# Patient Record
Sex: FEMALE | Race: WHITE | NOT HISPANIC OR LATINO | URBAN - METROPOLITAN AREA
[De-identification: names, ages, dates, MRNs, and addresses within clinical notes are randomized per-mention and may not be internally consistent; named-entity substitution may affect disease eponyms.]

---

## 2024-09-03 ENCOUNTER — OFFICE VISIT (OUTPATIENT)
Dept: ORTHOPEDIC SURGERY | Facility: CLINIC | Age: 69
End: 2024-09-03
Payer: MEDICARE

## 2024-09-03 VITALS — HEART RATE: 66 BPM | HEIGHT: 63 IN | BODY MASS INDEX: 39.87 KG/M2 | OXYGEN SATURATION: 99 % | WEIGHT: 225 LBS

## 2024-09-03 DIAGNOSIS — S52.562A CLOSED BARTON'S FRACTURE OF LEFT RADIUS, INITIAL ENCOUNTER: Primary | ICD-10-CM

## 2024-09-03 PROCEDURE — 1160F RVW MEDS BY RX/DR IN RCRD: CPT | Performed by: FAMILY MEDICINE

## 2024-09-03 PROCEDURE — 1159F MED LIST DOCD IN RCRD: CPT | Performed by: FAMILY MEDICINE

## 2024-09-03 PROCEDURE — 99203 OFFICE O/P NEW LOW 30 MIN: CPT | Performed by: FAMILY MEDICINE

## 2024-09-03 RX ORDER — LACTULOSE 10 G/15ML
SOLUTION ORAL
COMMUNITY
Start: 2024-06-16

## 2024-09-03 RX ORDER — FUROSEMIDE 40 MG
1 TABLET ORAL DAILY
COMMUNITY
Start: 2024-08-30

## 2024-09-03 RX ORDER — BACLOFEN 20 MG
1 TABLET ORAL EVERY 12 HOURS SCHEDULED
COMMUNITY
Start: 2024-07-29

## 2024-09-03 RX ORDER — OMEPRAZOLE 40 MG/1
1 CAPSULE, DELAYED RELEASE ORAL DAILY
COMMUNITY

## 2024-09-03 RX ORDER — MECLIZINE HYDROCHLORIDE 25 MG/1
1 TABLET ORAL 3 TIMES DAILY PRN
COMMUNITY

## 2024-09-03 RX ORDER — ATORVASTATIN CALCIUM 10 MG/1
1 TABLET, FILM COATED ORAL DAILY
COMMUNITY

## 2024-09-03 RX ORDER — HYDROCODONE BITARTRATE AND ACETAMINOPHEN 5; 325 MG/1; MG/1
1 TABLET ORAL EVERY 6 HOURS PRN
COMMUNITY
Start: 2024-08-30

## 2024-09-03 RX ORDER — NORTRIPTYLINE HCL 10 MG
CAPSULE ORAL
COMMUNITY

## 2024-09-03 RX ORDER — DICYCLOMINE HCL 20 MG
1 TABLET ORAL 2 TIMES DAILY
COMMUNITY

## 2024-09-03 RX ORDER — SEMAGLUTIDE 1.34 MG/ML
INJECTION, SOLUTION SUBCUTANEOUS
COMMUNITY

## 2024-09-03 RX ORDER — SPIRONOLACTONE 100 MG/1
1 TABLET, FILM COATED ORAL DAILY
COMMUNITY

## 2024-09-03 RX ORDER — RIZATRIPTAN BENZOATE 10 MG/1
TABLET ORAL
COMMUNITY

## 2024-09-03 NOTE — PROGRESS NOTES
"Primary Care Sports Medicine Office Visit Note    Patient ID: Katrin Cornejo is a 69 y.o. female.    Chief Complaint:  Chief Complaint   Patient presents with    Left Wrist - Pain, Initial Evaluation     DOI: 8/29/24 fall   7/10 pain        History of Present Illness  The patient presents for evaluation of left wrist pain. She is accompanied by her sister.    She experienced a fall at approximately 4:15 in the morning while attempting to use the restroom, during which she extended her hand to break the fall. She also reports tenderness in her elbow.    Supplemental Information  She has diabetes, vertigo, nonalcoholic cirrhosis of the liver. She is on Lasix for lower leg swelling. Her kidneys are healthy. She has no heart problems.       Past Medical History:   Diagnosis Date    Diabetes     GERD (gastroesophageal reflux disease)     High cholesterol     Hypertension     Liver cirrhosis secondary to PATE (nonalcoholic steatohepatitis)     Migraine     Vertigo        Past Surgical History:   Procedure Laterality Date    GALLBLADDER SURGERY      HYSTERECTOMY      SHOULDER ARTHROSCOPY Right        History reviewed. No pertinent family history.  Social History     Occupational History    Not on file   Tobacco Use    Smoking status: Never     Passive exposure: Never    Smokeless tobacco: Never   Vaping Use    Vaping status: Never Used   Substance and Sexual Activity    Alcohol use: Never    Drug use: Never    Sexual activity: Never        Review of Systems:  Review of Systems   Constitutional:  Negative for activity change, fatigue and fever.   Musculoskeletal:  Positive for arthralgias.   Skin:  Negative for color change and rash.   Neurological:  Negative for numbness.     Objective:  Physical Exam  Pulse 66   Ht 160 cm (63\")   Wt 102 kg (225 lb)   SpO2 99%   BMI 39.86 kg/m²   Vitals and nursing note reviewed.   Constitutional:       General: she  is not in acute distress.     Appearance: she is well-developed. He is " "not diaphoretic.   HENT:      Head: Normocephalic and atraumatic.   Eyes:      Conjunctiva/sclera: Conjunctivae normal.   Pulmonary:      Effort: Pulmonary effort is normal. No respiratory distress.   Skin:     General: Skin is warm.      Capillary Refill: Capillary refill takes less than 2 seconds.   Neurological:      Mental Status: she is alert.     Ortho Exam:  Physical Exam  Evaluation of the left wrist reveals significant bruising and ecchymosis encompassing the entire wrist and back of the hand. Wrist movements including flexion, extension, ulnar deviation, radial deviation, pronation, and supination are severely restricted, with only about 5 or 10 degrees of each motion possible. The distal radius is notably tender to touch. The distal radial ulnar joint exhibits clear laxity.    Results  Imaging  X-ray of the left wrist reveals a three-part Ghotra's fracture of the distal radius and an ulnar styloid fracture of the distal ulna.    Diagnoses and all orders for this visit:          Assessment & Plan  1. Closed Ghotra's fracture of left radius, initial encounter (Primary)  -     Ambulatory Referral to Hand Surgery    The left wrist evaluation shows significant bruising, ecchymosis, and limited motion. Imaging reveals an intra-articular 3-part Ghotra's fracture. Surgical intervention is recommended to realign the bone and prevent long-term arthritis and pain. A referral to a hand surgeon has been made for further evaluation and treatment planning. If surgery is not pursued, a cast may be considered, but it may result in poor healing and chronic pain.    2. Left Ulnar styloid fracture    Imaging also shows a fracture of the ulnar styloid. This fracture is less critical as it does not significantly impact wrist function. It may heal on its own or form scar tissue without causing issues. No specific intervention is required for this fracture unless it becomes symptomatic.    Aryan LEE \"Chance\" Yousif II, DO, " [facility]  09/03/24  10:40 EDT    Disclaimer: Please note that areas of this note were completed with computer voice recognition software.  Quite often unanticipated grammatical, syntax, homophones, and other interpretive errors are inadvertently transcribed by the computer software. Please excuse any errors that have escaped final proofreading.    Patient or patient representative verbalized consent for the use of Ambient Listening during the visit with  Aryan Dodd II, DO for chart documentation. 10:40 EDT 09/03/24

## 2024-09-06 ENCOUNTER — PATIENT ROUNDING (BHMG ONLY) (OUTPATIENT)
Dept: ORTHOPEDIC SURGERY | Facility: CLINIC | Age: 69
End: 2024-09-06
Payer: MEDICARE